# Patient Record
Sex: FEMALE | Race: WHITE | URBAN - METROPOLITAN AREA
[De-identification: names, ages, dates, MRNs, and addresses within clinical notes are randomized per-mention and may not be internally consistent; named-entity substitution may affect disease eponyms.]

---

## 2017-12-13 ENCOUNTER — HOSPITAL ENCOUNTER (OUTPATIENT)
Dept: PHYSICAL THERAPY | Age: 27
Discharge: HOME | End: 2017-12-13
Attending: NURSE PRACTITIONER
Payer: MEDICARE

## 2017-12-13 DIAGNOSIS — R53.1 WEAKNESS: ICD-10-CM

## 2017-12-13 DIAGNOSIS — F50.00 ANOREXIA NERVOSA WITH DANGEROUSLY LOW BODY WEIGHT: ICD-10-CM

## 2017-12-13 DIAGNOSIS — R29.898 DECREASED MUSCLE FUNCTION: ICD-10-CM

## 2017-12-13 PROCEDURE — 97110 THERAPEUTIC EXERCISES: CPT

## 2017-12-13 PROCEDURE — G8978 MOBILITY CURRENT STATUS: HCPCS

## 2017-12-13 PROCEDURE — 97162 PT EVAL MOD COMPLEX 30 MIN: CPT

## 2017-12-13 PROCEDURE — G8979 MOBILITY GOAL STATUS: HCPCS

## 2017-12-13 NOTE — THERAPY EVALUATION
Florence Velazquez  : 1990  [place]  Secondary:  [address]  at [place] [id number] [facility], [address] [place] [address],[address], [facility] [address]  [phone]   [phone]          OUTPATIENT PHYSICAL [place] Assessment 2017    ICD-10: Treatment Diagnosis: weakness M62.81  Precautions/Allergies:   Alprazolam; Amoxicillin-pot clavulanate; Ciprofloxacin; Imitrex [sumatriptan]; and Lorazepam   Fall Risk Score: 0 (? 5 = High Risk)  MD Orders: eval and treat MEDICAL/REFERRING DIAGNOSIS:  Anorexia nervosa with dangerously low body weight [F50.00]  Decreased muscle function [R29.898]  Weakness [R53.1]   DATE OF ONSET: chronic, worsening   REFERRING PHYSICIAN: Russ Simons NP  RETURN PHYSICIAN APPOINTMENT: TBD     INITIAL ASSESSMENT:  Ms. Edgar Randle presents with general LE, UE and core strength deficits, significantly restricted common ankle tendon bilaterally, decreased hip and UE ROM bilaterally, restricted hamstrings and hip flexors bilaterally that limits ability to community ambulate and perform ADLs. Pt would benefit from skilled therapy to address these deficits for return to previous level of function. PROBLEM LIST (Impacting functional limitations):  1. Decreased Strength  2. Decreased ADL/Functional Activities  3. Decreased Transfer Abilities  4. Decreased Ambulation Ability/Technique  5. Decreased Balance  6. Decreased [place] with Home Exercise Program INTERVENTIONS PLANNED:  1. Balance Exercise  2. Gait Training  3. Home Exercise Program (HEP)  4. Manual Therapy  5. Neuromuscular Re-education/Strengthening  6. Therapeutic Exercise/Strengthening   TREATMENT PLAN:  Effective Dates: 17 TO 18. Frequency/Duration: 3 times a week for 6 weeks  GOALS: (Goals have been discussed and agreed upon with patient.)  Short-Term Functional Goals: Time Frame: 4 weeks  1.  Pt will be I with HEP to allow for continued progress with symptom management. 2. Pt will improve sit to stand with no genu valgus. Discharge Goals: Time Frame: 6 weeks  1. Pt will improve LEFS score to >62 to reflect an improvement in function. 2. Pt will improve c/o pain to 2/10 to allow for improved tolerance for ambulation. 3. Pt will improve LE strength globally to 4+/5 for improved ability to community ambulate. 4. Pt will improve UE strength to 5-/5 globally for improved ability to lift and reach. 5. Pt will improve ankle dorsiflexion ROM to 10 degrees bilaterally for improved gait. 6. Pt will improve hip ER ROM to 60 degrees bilaterally for improved ability to sit on floor. Rehabilitation Potential For Stated Goals: Good  Regarding Sandra Parkerer therapy, I certify that the treatment plan above will be carried out by a therapist or under their direction. Thank you for this referral,  Evangelista Navarrete PT     Referring Physician Signature: Avelino Artis NP              Date                    The information in this section was collected on 12-13-17 (except where otherwise noted). HISTORY:   History of Present Injury/Illness (Reason for Referral):  Pt is a 32 y.o. Female presenting to PT with general weakness following hospitalization for 11 months. Was hospitalized for heart failure, liver failure and sepsis. During stay in hospital she states that she atrophied to the point that she could not move her body. Was bed-ridden for 3 months with ventilator and trach. Had difficulty lifting her head during her hospital stay. Went to a nursing home for the muscle wasting and contractures. Had bed sores as well and was limited in ability to participate in PT. Had problems with her feet that resembled drop foot. Went to a eating disorder unit and has been able to return to daily activities. Continues to have trouble with ascending stairs, squatting, sitting on the floor due to hip tightness, laying on her stomach.   C/o numbness and tingling in hips and legs when she tries to sleep on her. Is able to due daily activities but struggles with balance and has to alter the way that she does it. Is easily winded and has some back pain when she prolonged walks. Some c/o tenderness in her feet and ankles. Past Medical History/Comorbidities:   Ms. Wojciech Bedolla  has a past medical history of Heart failure ([facility] [address] (2017) and Liver failure ([facility] [address] (2017). Ms. Wojciech Bedolla  has a past surgical history that includes tracheostomy; appendectomy; and wrist fracture tx. Social History/Living Environment:     lives with dad  Prior Level of Function/Work/Activity:  Prior to most recent hospitalization pt was independent with all ADLs and describes self as an \"active person\"  Personal Factors: Other factors that influence how disability is experienced by the patient:  Previous exercise limitations due to eating disorder    Current Medications:       Current Outpatient Prescriptions:     QUEtiapine (SEROQUEL) 25 mg tablet, Take 1 Tab by mouth nightly., Disp: 30 Tab, Rfl: 1    pregabalin (LYRICA) 50 mg capsule, Take 1 Cap by mouth three (3) times daily. Max Daily Amount: 150 mg., Disp: 21 Cap, Rfl: 0    cyclobenzaprine (FLEXERIL) 5 mg tablet, TAKE 1 TABLET BY MOUTH TWICE A DAY, Disp: 60 Tab, Rfl: 1    clonazePAM (KLONOPIN) 0.5 mg tablet, TAKE 1 TABLET BY MOUTH EVERY DAY AT BEDTIME. Can take 1/2 tablet as needed for anxiety. , Disp: 45 Tab, Rfl: 0    oxyCODONE IR (ROXICODONE) 10 mg tab immediate release tablet, Take 1 Tab by mouth every twelve (12) hours as needed.  Max Daily Amount: 20 mg., Disp: 60 Tab, Rfl: 0    morphine CR (MS CONTIN) 15 mg CR tablet, Take 1 tablet bid x 1 week, then 1 tablet daily x 2 weeks, then stop., Disp: 28 Tab, Rfl: 0   Date Last Reviewed:  12/13/2017    Number of Personal Factors/Comorbidities that affect the Plan of Care: 1-2: MODERATE COMPLEXITY   EXAMINATION:   Observation/Orthostatic Postural Assessment:          Genu valgus with sit-to-stand, general muscle atrophy globally  Strength:            UPPER EXTREMITY      Left  Right    Shoulder Flexion   Shoulder ABD  Shoulder ER  Shoulder IR  Elbow Flexion  Elbow Extension 5-/5  5-/5  5-/5  5-/5  5-/5  5-/5 Shoulder Flexion   Shoulder ABD  Shoulder ER  Shoulder IR  Elbow Flexion  Elbow Extension   5-/5  5-/5  5-/5  5-/5  5-/5  5-/5          LOWER EXTREMITY      Left  Right    Knee flexion  Knee extension  Hip Ext  Hip Flexion  Hip ER  Hip IR   Hip ABD  Dorsiflexion 4-/5  4-/5  3+/5  4+/5  3+/5  3+/5  3+/5  4+/5 Knee flexion   Knee extension  Hip Ext  Hip Flexion  Hip ER  Hip IR  Hip ABD  Dorsiflexion 4-/5  4-/5  3+/5  4/5  3+/5  3+/5  3+/5  4+/5        ROM:    SPINE SPINE    AROM    Flexion   Extension  Right Rotation  Left Rotation  Right Sidebend  Left Sidebend 90 %  90 %  90 %  90 %  90 %  90 %     Hamstrings extensibility- 40 degrees bilaterally  Aden test-  Positive bilaterally  Ankle dorsiflexion- neutral bilaterally    LOWER EXTREMITY      Left  Right    Hip ER  Hip IR 40 Degrees  25 Degrees Hip ER  Hip IR 44 Degrees  22 Degrees         Functional Mobility:         Bed Mobility:  Difficulty with supine to sit due to core strength deficits  Balance:          SLS:  L-  5 seconds, R- 5 seconds       Body Structures Involved:  1. Nerves  2. Joints  3. Muscles Body Functions Affected:  1. Sensory/Pain  2. Neuromusculoskeletal  3. Movement Related Activities and Participation Affected:  1. General Tasks and Demands  2. Mobility  3. Self Care   Number of elements (examined above) that affect the Plan of Care: 4+: HIGH COMPLEXITY   CLINICAL PRESENTATION:   Presentation: Evolving clinical presentation with changing clinical characteristics: MODERATE COMPLEXITY   CLINICAL DECISION MAKING:   Outcome Measure:    Tool Used: Lower Extremity Functional Scale (LEFS)  Score:  Initial: 17/80 Most Recent: X/80 (Date: -- )   Interpretation of Score: 20 questions each scored on a 5 point scale with 0 representing \"extreme difficulty or unable to perform\" and 4 representing \"no difficulty\". The lower the score, the greater the functional disability. 80/80 represents no disability. Minimal detectable change is 9 points. Score 80 79-63 62-48 47-32 31-16 15-1 0   Modifier CH CI CJ CK CL CM CN     ? Mobility - Walking and Moving Around:     - CURRENT STATUS: CL - 60%-79% impaired, limited or restricted    - GOAL STATUS: CI - 1%-19% impaired, limited or restricted    - D/C STATUS:  ---------------To be determined---------------      · Medical Necessity: Patient is expected to demonstrate progress in strength and range of motion to return to previous level of function. Reason for Services/Other Comments:  · Patient continues to require present interventions due to patient's inability to prolonged ambulate. Use of outcome tool(s) and clinical judgement create a POC that gives a: Questionable prediction of patient's progress: MODERATE COMPLEXITY            TREATMENT:   (In addition to Assessment/Re-Assessment sessions the following treatments were rendered)  Pre-treatment Symptoms/Complaints:  General weakness following hospitalization  Pain: Initial:    Post Session:  3/10     THERAPEUTIC EXERCISE: (15 minutes):  Exercises per grid below to improve mobility and strength. Required moderate visual, verbal and manual cues to promote proper body alignment, promote proper body posture and promote proper body mechanics. Progressed resistance, range, repetitions and complexity of movement as indicated.    Date:  12-13-17 Date:   Date:     Activity/Exercise Parameters Parameters Parameters   Hip flexor stretch on table 3 min     Bridge with band 20 reps     Scapular retraction with ER Red x 20 reps     TA isolation 5 min                          MedBridge Portal  Treatment/Session Assessment:    · Response to Treatment:  Pt would benefit from skilled therapy to address the aforementioned deficits that limit functional ability to community ambulate. .  · Compliance with Program/Exercises: Will assess as treatment progresses. · Recommendations/Intent for next treatment session: \"Next visit will focus on advancements to more challenging activities\".   Total Treatment Duration:  PT Patient Time In/Time Out  Time In: 1524  Time Out: [address]

## 2017-12-13 NOTE — PROGRESS NOTES
Ambulatory/Rehab Services H2 Model Falls Risk Assessment    Risk Factor Pts. ·   Confusion/Disorientation/Impulsivity  []    4 ·   Symptomatic Depression  []   2 ·   Altered Elimination  []   1 ·   Dizziness/Vertigo  []   1 ·   Gender (Male)  []   1 ·   Any administered antiepileptics (anticonvulsants):  []   2 ·   Any administered benzodiazepines:  []   1 ·   Visual Impairment (specify):  []   1 ·   Portable Oxygen Use  []   1 ·   Orthostatic ? BP  []   1 ·   History of Recent Falls (within 3 mos.)  []   5     Ability to Rise from Chair (choose one) Pts. ·   Ability to rise in a single movement  [x]   0 ·   Pushes up, successful in one attempt  []   1 ·   Multiple attempts, but successful  []   3 ·   Unable to rise without assistance  []   4   Total: (5 or greater = High Risk) 0     Falls Prevention Plan:   []                Physical Limitations to Exercise (specify):   []                Mobility Assistance Device (type):   []                Exercise/Equipment Adaptation (specify):    ©2010 [facility] [address] Patent #[id number].  Federal Law prohibits the replication, distribution or use without written permission from [facility] [employer]

## 2017-12-19 ENCOUNTER — HOSPITAL ENCOUNTER (OUTPATIENT)
Dept: PHYSICAL THERAPY | Age: 27
Discharge: HOME | End: 2017-12-19
Attending: NURSE PRACTITIONER
Payer: MEDICARE

## 2017-12-19 PROCEDURE — 97110 THERAPEUTIC EXERCISES: CPT

## 2017-12-19 NOTE — PROGRESS NOTES
Tiera Roberson  : 1990  [place]  Secondary:  Therapy Center at [address] [place] [address],[address], [address] [facility]  [phone]   [phone]          OUTPATIENT PHYSICAL THERAPY:Daily Note 2017    ICD-10: Treatment Diagnosis: weakness M62.81  Precautions/Allergies:   Alprazolam; Amoxicillin-pot clavulanate; Ciprofloxacin; Imitrex [sumatriptan]; and Lorazepam   Fall Risk Score: 0 (? 5 = High Risk)  MD Orders: eval and treat MEDICAL/REFERRING DIAGNOSIS:  Anorexia [R63.0]   DATE OF ONSET: chronic, worsening   REFERRING PHYSICIAN: Cardell Spurling, NP  RETURN PHYSICIAN APPOINTMENT: TBELAINE     INITIAL ASSESSMENT:  Ms. Angie Hines presents with general LE, UE and core strength deficits, significantly restricted common ankle tendon bilaterally, decreased hip and UE ROM bilaterally, restricted hamstrings and hip flexors bilaterally that limits ability to community ambulate and perform ADLs. Pt would benefit from skilled therapy to address these deficits for return to previous level of function. PROBLEM LIST (Impacting functional limitations):  1. Decreased Strength  2. Decreased ADL/Functional Activities  3. Decreased Transfer Abilities  4. Decreased Ambulation Ability/Technique  5. Decreased Balance  6. Decreased [place] with Home Exercise Program INTERVENTIONS PLANNED:  1. Balance Exercise  2. Gait Training  3. Home Exercise Program (HEP)  4. Manual Therapy  5. Neuromuscular Re-education/Strengthening  6. Therapeutic Exercise/Strengthening   TREATMENT PLAN:  Effective Dates: 17 TO 18. Frequency/Duration: 3 times a week for 6 weeks  GOALS: (Goals have been discussed and agreed upon with patient.)  Short-Term Functional Goals: Time Frame: 4 weeks  1. Pt will be I with HEP to allow for continued progress with symptom management. 2. Pt will improve sit to stand with no genu valgus.   Discharge Goals: Time Frame: 6 weeks  1. Pt will improve LEFS score to >62 to reflect an improvement in function. 2. Pt will improve c/o pain to 2/10 to allow for improved tolerance for ambulation. 3. Pt will improve LE strength globally to 4+/5 for improved ability to community ambulate. 4. Pt will improve UE strength to 5-/5 globally for improved ability to lift and reach. 5. Pt will improve ankle dorsiflexion ROM to 10 degrees bilaterally for improved gait. 6. Pt will improve hip ER ROM to 60 degrees bilaterally for improved ability to sit on floor. Rehabilitation Potential For Stated Goals: Good  Regarding Roxy Tipton therapy, I certify that the treatment plan above will be carried out by a therapist or under their direction. Thank you for this referral,  Sumi Matthews PTA     Referring Physician Signature: Nata Sifuentes NP              Date                    The information in this section was collected on 12-13-17 (except where otherwise noted). HISTORY:   History of Present Injury/Illness (Reason for Referral):  Pt is a 32 y.o. Female presenting to PT with general weakness following hospitalization for 11 months. Was hospitalized for heart failure, liver failure and sepsis. During stay in hospital she states that she atrophied to the point that she could not move her body. Was bed-ridden for 3 months with ventilator and trach. Had difficulty lifting her head during her hospital stay. Went to a nursing home for the muscle wasting and contractures. Had bed sores as well and was limited in ability to participate in PT. Had problems with her feet that resembled drop foot. Went to a eating disorder unit and has been able to return to daily activities. Continues to have trouble with ascending stairs, squatting, sitting on the floor due to hip tightness, laying on her stomach. C/o numbness and tingling in hips and legs when she tries to sleep on her.   Is able to due daily activities but struggles with balance and has to alter the way that she does it. Is easily winded and has some back pain when she prolonged walks. Some c/o tenderness in her feet and ankles. Past Medical History/Comorbidities:   Ms. Angelo Golden  has a past medical history of Heart failure ([facility] [address] (2017) and Liver failure ([facility] [address] (2017). Ms. Angelo Golden  has a past surgical history that includes tracheostomy; appendectomy; and wrist fracture tx. Social History/Living Environment:     lives with dad  Prior Level of Function/Work/Activity:  Prior to most recent hospitalization pt was independent with all ADLs and describes self as an \"active person\"  Personal Factors: Other factors that influence how disability is experienced by the patient:  Previous exercise limitations due to eating disorder    Current Medications:       Current Outpatient Prescriptions:     azithromycin (ZITHROMAX) 250 mg tablet, Take 1 Tab by mouth See Admin Instructions for 5 days. , Disp: 6 Tab, Rfl: 0    predniSONE (DELTASONE) 10 mg tablet, Take 1 Tab by mouth three (3) times daily. , Disp: 10 Tab, Rfl: 0    [START ON 12/28/2017] oxyCODONE IR (ROXICODONE) 10 mg tab immediate release tablet, Take 1 Tab by mouth every twelve (12) hours as needed. Max Daily Amount: 20 mg., Disp: 60 Tab, Rfl: 0    QUEtiapine (SEROQUEL) 25 mg tablet, Take 1 Tab by mouth nightly., Disp: 30 Tab, Rfl: 1    cyclobenzaprine (FLEXERIL) 5 mg tablet, TAKE 1 TABLET BY MOUTH TWICE A DAY, Disp: 60 Tab, Rfl: 1    clonazePAM (KLONOPIN) 0.5 mg tablet, TAKE 1 TABLET BY MOUTH EVERY DAY AT BEDTIME. Can take 1/2 tablet as needed for anxiety. , Disp: 45 Tab, Rfl: 0   Date Last Reviewed:  12/19/2017    Number of Personal Factors/Comorbidities that affect the Plan of Care: 1-2: MODERATE COMPLEXITY   EXAMINATION:   Observation/Orthostatic Postural Assessment:          Genu valgus with sit-to-stand, general muscle atrophy globally  Strength:            UPPER EXTREMITY      Left  Right    Shoulder Flexion   Shoulder ABD  Shoulder ER  Shoulder IR  Elbow Flexion  Elbow Extension 5-/5  5-/5  5-/5  5-/5  5-/5  5-/5 Shoulder Flexion   Shoulder ABD  Shoulder ER  Shoulder IR  Elbow Flexion  Elbow Extension   5-/5  5-/5  5-/5  5-/5  5-/5  5-/5          LOWER EXTREMITY      Left  Right    Knee flexion  Knee extension  Hip Ext  Hip Flexion  Hip ER  Hip IR   Hip ABD  Dorsiflexion 4-/5  4-/5  3+/5  4+/5  3+/5  3+/5  3+/5  4+/5 Knee flexion   Knee extension  Hip Ext  Hip Flexion  Hip ER  Hip IR  Hip ABD  Dorsiflexion 4-/5  4-/5  3+/5  4/5  3+/5  3+/5  3+/5  4+/5        ROM:    SPINE SPINE    AROM    Flexion   Extension  Right Rotation  Left Rotation  Right Sidebend  Left Sidebend 90 %  90 %  90 %  90 %  90 %  90 %     Hamstrings extensibility- 40 degrees bilaterally  Aden test-  Positive bilaterally  Ankle dorsiflexion- neutral bilaterally    LOWER EXTREMITY      Left  Right    Hip ER  Hip IR 40 Degrees  25 Degrees Hip ER  Hip IR 44 Degrees  22 Degrees         Functional Mobility:         Bed Mobility:  Difficulty with supine to sit due to core strength deficits  Balance:          SLS:  L-  5 seconds, R- 5 seconds       Body Structures Involved:  1. Nerves  2. Joints  3. Muscles Body Functions Affected:  1. Sensory/Pain  2. Neuromusculoskeletal  3. Movement Related Activities and Participation Affected:  1. General Tasks and Demands  2. Mobility  3. Self Care   Number of elements (examined above) that affect the Plan of Care: 4+: HIGH COMPLEXITY   CLINICAL PRESENTATION:   Presentation: Evolving clinical presentation with changing clinical characteristics: MODERATE COMPLEXITY   CLINICAL DECISION MAKING:   Outcome Measure: Tool Used: Lower Extremity Functional Scale (LEFS)  Score:  Initial: 17/80 Most Recent: X/80 (Date: -- )   Interpretation of Score: 20 questions each scored on a 5 point scale with 0 representing \"extreme difficulty or unable to perform\" and 4 representing \"no difficulty\".   The lower the score, the greater the functional disability. 80/80 represents no disability. Minimal detectable change is 9 points. Score 80 79-63 62-48 47-32 31-16 15-1 0   Modifier CH CI CJ CK CL CM CN     ? Mobility - Walking and Moving Around:     - CURRENT STATUS: CL - 60%-79% impaired, limited or restricted    - GOAL STATUS: CI - 1%-19% impaired, limited or restricted    - D/C STATUS:  ---------------To be determined---------------      · Medical Necessity: Patient is expected to demonstrate progress in strength and range of motion to return to previous level of function. Reason for Services/Other Comments:  · Patient continues to require present interventions due to patient's inability to prolonged ambulate. Use of outcome tool(s) and clinical judgement create a POC that gives a: Questionable prediction of patient's progress: MODERATE COMPLEXITY            TREATMENT:   (In addition to Assessment/Re-Assessment sessions the following treatments were rendered)  Pre-treatment Symptoms/Complaints:  \"I was just at [facility], I have sinus infection and brochitis, got an antibiotic, feeling worn down\"  Pain: Initial:    Post Session:  3/10     THERAPEUTIC EXERCISE: (30 minutes):  Exercises per grid below to improve mobility and strength. Required minimal visual, verbal and manual cues to promote proper body alignment, promote proper body posture and promote proper body mechanics. Progressed resistance, range, repetitions and complexity of movement as indicated.    Date:  12-19-17 Date:   Date:     Activity/Exercise Parameters Parameters Parameters   Hip flexor stretch on table 3 min     Bridge with band 20 reps     Scapular retraction with ER Red x 20 reps     UBE X 5 min Level 2.0     Tband Rows-Extension Black x 10 reps     Clam Shell Blue x 15 reps     Standing Hip abduction-knee flexion #2 x 10 reps each                 TA isolation 5 min     Bicep Curl-Tricep Curl #3 x 10 reps     LAQ- seated March #2 x 10 reps              MedBridge Portal  Treatment/Session Assessment:    · Response to Treatment:  Patient fatigued today secondary to having a long weekend of driving to visit boyfriend and not feeling well overall. Did 30 min of therapy today for strengthening and abdominal work. Slowly progress. · Compliance with Program/Exercises: Will assess as treatment progresses. · Recommendations/Intent for next treatment session: \"Next visit will focus on advancements to more challenging activities\".   Total Treatment Duration:  PT Patient Time In/Time Out  Time In: 9026  Time Out: Donna 57, PTA

## 2017-12-21 ENCOUNTER — HOSPITAL ENCOUNTER (OUTPATIENT)
Dept: PHYSICAL THERAPY | Age: 27
Discharge: HOME | End: 2017-12-21
Attending: NURSE PRACTITIONER
Payer: MEDICARE

## 2017-12-21 PROCEDURE — 97140 MANUAL THERAPY 1/> REGIONS: CPT

## 2017-12-21 PROCEDURE — 97110 THERAPEUTIC EXERCISES: CPT

## 2017-12-21 PROCEDURE — 97035 APP MDLTY 1+ULTRASOUND EA 15: CPT

## 2017-12-21 NOTE — PROGRESS NOTES
Carol Ann Rosas  : 1990  [place]  Secondary:  [address] [place]  at [facility]  [address], [address] [facility] [address], [address]  [phone]   [phone]          OUTPATIENT PHYSICAL THERAPY:Daily Note 2017    ICD-10: Treatment Diagnosis: weakness M62.81  Precautions/Allergies:   Alprazolam; Amoxicillin-pot clavulanate; Ciprofloxacin; Imitrex [sumatriptan]; and Lorazepam   Fall Risk Score: 0 (? 5 = High Risk)  MD Orders: eval and treat MEDICAL/REFERRING DIAGNOSIS:  Anorexia [R63.0]   DATE OF ONSET: chronic, worsening   REFERRING PHYSICIAN: Deloris Juan NP  RETURN PHYSICIAN APPOINTMENT: TBD     INITIAL ASSESSMENT:  Ms. Sang Malodnado presents with general LE, UE and core strength deficits, significantly restricted common ankle tendon bilaterally, decreased hip and UE ROM bilaterally, restricted hamstrings and hip flexors bilaterally that limits ability to community ambulate and perform ADLs. Pt would benefit from skilled therapy to address these deficits for return to previous level of function. PROBLEM LIST (Impacting functional limitations):  1. Decreased Strength  2. Decreased ADL/Functional Activities  3. Decreased Transfer Abilities  4. Decreased Ambulation Ability/Technique  5. Decreased Balance  6. Decreased [place] with Home Exercise Program INTERVENTIONS PLANNED:  1. Balance Exercise  2. Gait Training  3. Home Exercise Program (HEP)  4. Manual Therapy  5. Neuromuscular Re-education/Strengthening  6. Therapeutic Exercise/Strengthening   TREATMENT PLAN:  Effective Dates: 17 TO 18. Frequency/Duration: 3 times a week for 6 weeks  GOALS: (Goals have been discussed and agreed upon with patient.)  Short-Term Functional Goals: Time Frame: 4 weeks  1. Pt will be I with HEP to allow for continued progress with symptom management. 2. Pt will improve sit to stand with no genu valgus.   Discharge Goals: Time Frame: 6 weeks  1. Pt will improve LEFS score to >62 to reflect an improvement in function. 2. Pt will improve c/o pain to 2/10 to allow for improved tolerance for ambulation. 3. Pt will improve LE strength globally to 4+/5 for improved ability to community ambulate. 4. Pt will improve UE strength to 5-/5 globally for improved ability to lift and reach. 5. Pt will improve ankle dorsiflexion ROM to 10 degrees bilaterally for improved gait. 6. Pt will improve hip ER ROM to 60 degrees bilaterally for improved ability to sit on floor. Rehabilitation Potential For Stated Goals: Good  Regarding [facility], I certify that the treatment plan above will be carried out by a therapist or under their direction. Thank you for this referral,  Kiarra Varghese PT     Referring Physician Signature: Dana Harrison NP              Date                    The information in this section was collected on 12-13-17 (except where otherwise noted). HISTORY:   History of Present Injury/Illness (Reason for Referral):  Pt is a 32 y.o. Female presenting to PT with general weakness following hospitalization for 11 months. Was hospitalized for heart failure, liver failure and sepsis. During stay in hospital she states that she atrophied to the point that she could not move her body. Was bed-ridden for 3 months with ventilator and trach. Had difficulty lifting her head during her hospital stay. Went to a nursing home for the muscle wasting and contractures. Had bed sores as well and was limited in ability to participate in PT. Had problems with her feet that resembled drop foot. Went to a eating disorder unit and has been able to return to daily activities. Continues to have trouble with ascending stairs, squatting, sitting on the floor due to hip tightness, laying on her stomach. C/o numbness and tingling in hips and legs when she tries to sleep on her.   Is able to due daily activities but struggles with balance and has to alter the way that she does it. Is easily winded and has some back pain when she prolonged walks. Some c/o tenderness in her feet and ankles. Past Medical History/Comorbidities:   Ms. Ese Haddad  has a past medical history of Heart failure ([facility] [address] (2017) and Liver failure ([facility] [address] (2017). Ms. Ese Haddad  has a past surgical history that includes hx tracheostomy; hx appendectomy; and hx wrist fracture tx. Social History/Living Environment:     lives with dad  Prior Level of Function/Work/Activity:  Prior to most recent hospitalization pt was independent with all ADLs and describes self as an \"active person\"  Personal Factors: Other factors that influence how disability is experienced by the patient:  Previous exercise limitations due to eating disorder    Current Medications:       Current Outpatient Prescriptions:     azithromycin (ZITHROMAX) 250 mg tablet, Take 1 Tab by mouth See Admin Instructions for 5 days. , Disp: 6 Tab, Rfl: 0    predniSONE (DELTASONE) 10 mg tablet, Take 1 Tab by mouth three (3) times daily. , Disp: 10 Tab, Rfl: 0    [START ON 12/28/2017] oxyCODONE IR (ROXICODONE) 10 mg tab immediate release tablet, Take 1 Tab by mouth every twelve (12) hours as needed. Max Daily Amount: 20 mg., Disp: 60 Tab, Rfl: 0    QUEtiapine (SEROQUEL) 25 mg tablet, Take 1 Tab by mouth nightly., Disp: 30 Tab, Rfl: 1    cyclobenzaprine (FLEXERIL) 5 mg tablet, TAKE 1 TABLET BY MOUTH TWICE A DAY, Disp: 60 Tab, Rfl: 1    clonazePAM (KLONOPIN) 0.5 mg tablet, TAKE 1 TABLET BY MOUTH EVERY DAY AT BEDTIME. Can take 1/2 tablet as needed for anxiety. , Disp: 45 Tab, Rfl: 0   Date Last Reviewed:  12/21/2017    Number of Personal Factors/Comorbidities that affect the Plan of Care: 1-2: MODERATE COMPLEXITY   EXAMINATION:   Observation/Orthostatic Postural Assessment:          Genu valgus with sit-to-stand, general muscle atrophy globally  Strength:            UPPER EXTREMITY      Left  Right    Shoulder Flexion Shoulder ABD  Shoulder ER  Shoulder IR  Elbow Flexion  Elbow Extension 5-/5  5-/5  5-/5  5-/5  5-/5  5-/5 Shoulder Flexion   Shoulder ABD  Shoulder ER  Shoulder IR  Elbow Flexion  Elbow Extension   5-/5  5-/5  5-/5  5-/5  5-/5  5-/5          LOWER EXTREMITY      Left  Right    Knee flexion  Knee extension  Hip Ext  Hip Flexion  Hip ER  Hip IR   Hip ABD  Dorsiflexion 4-/5  4-/5  3+/5  4+/5  3+/5  3+/5  3+/5  4+/5 Knee flexion   Knee extension  Hip Ext  Hip Flexion  Hip ER  Hip IR  Hip ABD  Dorsiflexion 4-/5  4-/5  3+/5  4/5  3+/5  3+/5  3+/5  4+/5        ROM:    SPINE SPINE    AROM    Flexion   Extension  Right Rotation  Left Rotation  Right Sidebend  Left Sidebend 90 %  90 %  90 %  90 %  90 %  90 %     Hamstrings extensibility- 40 degrees bilaterally  Aden test-  Positive bilaterally  Ankle dorsiflexion- neutral bilaterally    LOWER EXTREMITY      Left  Right    Hip ER  Hip IR 40 Degrees  25 Degrees Hip ER  Hip IR 44 Degrees  22 Degrees         Functional Mobility:         Bed Mobility:  Difficulty with supine to sit due to core strength deficits  Balance:          SLS:  L-  5 seconds, R- 5 seconds       Body Structures Involved:  1. Nerves  2. Joints  3. Muscles Body Functions Affected:  1. Sensory/Pain  2. Neuromusculoskeletal  3. Movement Related Activities and Participation Affected:  1. General Tasks and Demands  2. Mobility  3. Self Care   Number of elements (examined above) that affect the Plan of Care: 4+: HIGH COMPLEXITY   CLINICAL PRESENTATION:   Presentation: Evolving clinical presentation with changing clinical characteristics: MODERATE COMPLEXITY   CLINICAL DECISION MAKING:   Outcome Measure: Tool Used: Lower Extremity Functional Scale (LEFS)  Score:  Initial: 17/80 Most Recent: X/80 (Date: -- )   Interpretation of Score: 20 questions each scored on a 5 point scale with 0 representing \"extreme difficulty or unable to perform\" and 4 representing \"no difficulty\".   The lower the score, the greater the functional disability. 80/80 represents no disability. Minimal detectable change is 9 points. Score 80 79-63 62-48 47-32 31-16 15-1 0   Modifier CH CI CJ CK CL CM CN     ? Mobility - Walking and Moving Around:     - CURRENT STATUS: CL - 60%-79% impaired, limited or restricted    - GOAL STATUS: CI - 1%-19% impaired, limited or restricted    - D/C STATUS:  ---------------To be determined---------------      · Medical Necessity: Patient is expected to demonstrate progress in strength and range of motion to return to previous level of function. Reason for Services/Other Comments:  · Patient continues to require present interventions due to patient's inability to prolonged ambulate. Use of outcome tool(s) and clinical judgement create a POC that gives a: Questionable prediction of patient's progress: MODERATE COMPLEXITY            TREATMENT:   (In addition to Assessment/Re-Assessment sessions the following treatments were rendered)  Pre-treatment Symptoms/Complaints:  Pt reports that she is doing her exercises at home  Pain: Initial: Pain Intensity 1: 0  Post Session:  0/10     THERAPEUTIC EXERCISE: (20 minutes):  Exercises per grid below to improve mobility and strength. Required minimal visual, verbal and manual cues to promote proper body alignment, promote proper body posture and promote proper body mechanics. Progressed resistance, range, repetitions and complexity of movement as indicated. MODALITIES: (10 minutes): *  Ultrasound was used today secondary to the patient having tightened structures limiting joint motion that require an increase in extensibility. Ultrasound was used today to increase muscle flexibility and increase tendon flexibility.     Date:  12-19-17 Date:  12-21-17 Date:     Activity/Exercise Parameters Parameters Parameters   Hip flexor stretch on table 3 min     Bridge with band 20 reps     Scapular retraction with ER Red x 20 reps     UBE X 5 min Level 2.0 6 min fwd/bwd level 3.0    Tband Rows-Extension Black x 10 reps     Clam Shell Blue x 15 reps     Standing Hip abduction-knee flexion #2 x 10 reps each                 TA isolation 5 min     Bicep Curl-Tricep Curl #3 x 10 reps     LAQ- seated March #2 x 10 reps     Figure 4 bridge  20 reps each    SLR  20 reps each    March with pause  4x30 ft    Side stepping  4x30 ft red band    Leg press  35 pounds B LE x 20 reps           MANUAL THERAPY (10 minutes)  Dorsiflexion ROM, hip ROM bilaterally To promote tissue length and joint mobility for return to previous level of function. MedBridge Portal  Treatment/Session Assessment:    · Response to Treatment:  Patient tolerating strengthening and manual stretching today. Continues to lack functional dorsiflexion. · Compliance with Program/Exercises: Will assess as treatment progresses. · Recommendations/Intent for next treatment session: \"Next visit will focus on advancements to more challenging activities\".   Total Treatment Duration:  PT Patient Time In/Time Out  Time In: 8714  Time Out: 102 [facility], PT

## 2017-12-28 ENCOUNTER — HOSPITAL ENCOUNTER (OUTPATIENT)
Dept: PHYSICAL THERAPY | Age: 27
Discharge: HOME | End: 2017-12-28
Attending: NURSE PRACTITIONER
Payer: MEDICARE

## 2017-12-28 NOTE — PROGRESS NOTES
Chad Lam  : 1990  [place]  Secondary:  [address] [place]  at [place] [id number] [place] [address], [address] [facility]  [phone]   [phone]      Pt unable to attend scheduled appointment due to being sick.     Chad Lam, PT, DPT

## 2018-01-04 ENCOUNTER — APPOINTMENT (OUTPATIENT)
Dept: PHYSICAL THERAPY | Age: 28
End: 2018-01-04
Attending: NURSE PRACTITIONER

## 2018-01-05 ENCOUNTER — APPOINTMENT (OUTPATIENT)
Dept: PHYSICAL THERAPY | Age: 28
End: 2018-01-05
Attending: NURSE PRACTITIONER

## 2018-01-09 ENCOUNTER — APPOINTMENT (OUTPATIENT)
Dept: PHYSICAL THERAPY | Age: 28
End: 2018-01-09
Attending: NURSE PRACTITIONER

## 2018-01-11 ENCOUNTER — APPOINTMENT (OUTPATIENT)
Dept: PHYSICAL THERAPY | Age: 28
End: 2018-01-11
Attending: NURSE PRACTITIONER

## 2018-01-12 ENCOUNTER — APPOINTMENT (OUTPATIENT)
Dept: PHYSICAL THERAPY | Age: 28
End: 2018-01-12
Attending: NURSE PRACTITIONER

## 2018-05-21 ENCOUNTER — PATIENT OUTREACH (OUTPATIENT)
Dept: CASE MANAGEMENT | Age: 28
End: 2018-05-21

## 2018-05-21 NOTE — PROGRESS NOTES
· Referral received on patient for recommendations on placement in a facility for eating disorder  · Patient has anorexia with a BMI of 12.75  · Patient is scheduled for labs today at PCP office  · I sent [employer] message to Reese Pinto NP about what social workers and myself have found out about resources for patient:  · \"We received the referral on Ms. Amber Ho and the social workers and myself have reviewed the chart. 1.  [facility] will not re-admit due to patient is non-compliant with their program. Patient also stated that she didn't agree with their program.  Patient works with Terrea Hatchet MSW at [facility]. 2. At [facility] patient spoke with Caden Frey and he offered the following list of institutions which all have declined to accept patient due to a BMI of less that 14 and unsafe to travel. [place] institutions are as follows: [address] [place] [address], [address] [facility], [address] for Overcoming Eating Disorders in [place] has a therapist Koki Rubio at [facility] in [address]     I have asked my team members to help me brainstorm and come up with any alternatives for this patient.     Would Nati Flores be willing to have a PEG tube placed by a GI doctor?   She may not be willing to do that but just a thought. .. Valerie Mccallum \"     · Will also ask patient if she has contacted [facility] in [facility] They have inpatient eating disorder treatment  would just need to see if they take Medicare? To reach the Partial Hospitalization/ Intensive Outpatient Program directly, call [phone]  · Will outreach to patient and follow up about these resources and possibility of a GI referral.  · Patient has f/u apt at PCP office on 5/24/18    This note will not be viewable in [address]

## 2018-05-22 ENCOUNTER — PATIENT OUTREACH (OUTPATIENT)
Dept: CASE MANAGEMENT | Age: 28
End: 2018-05-22

## 2018-05-22 NOTE — PROGRESS NOTES
· Patient had labs drawn on 5/21/18  · Patient has f/u apt with PCP on 5/24/18  · NP will ask patient about a possible referral to GI for PEG tube but NP doesn't think patient will agree to this  · Will follow up on 5/25/18 after apt with PCP office  This note will not be viewable in [address]

## 2018-05-25 ENCOUNTER — PATIENT OUTREACH (OUTPATIENT)
Dept: CASE MANAGEMENT | Age: 28
End: 2018-05-25

## 2018-05-25 NOTE — PROGRESS NOTES
· Outreached to patient to follow up and give additional phone numbers for possible eating disorder resources   · [place] eating disorders [phone]  · [address] eating disorders/behavioral health [phone]  · Patient stated that Dione Lomeli is reviewing her information again and will be calling her back  · Patient stated that she had left a message at [address] and is awaiting a return phone call  · Patient is adamantly against having a PEG tube or feeding tube placed  · Patient scheduled to f/u with Chivo Felix NP on 5/29/18  · Will follow up with patient next week about whether or not facilities will accept patient for treatment   This note will not be viewable in [address]

## 2018-06-01 ENCOUNTER — PATIENT OUTREACH (OUTPATIENT)
Dept: CASE MANAGEMENT | Age: 28
End: 2018-06-01

## 2018-06-01 NOTE — PROGRESS NOTES
· Patient's labs redrawn on 5/29/18 and labs are improving  · Weight was 65.8  · Patient still attending counseling   · Labs to be redrawn next week  · Patient has my contact information  · Will f/u with patient in 2 weeks  This note will not be viewable in [address]

## 2018-06-14 NOTE — PROGRESS NOTES
Morales Orellana  : 1990  [place]  Secondary:  [address] [place]  at [place] [address] [facility], [address] [place] [address],[address], [facility] [address]  [phone]   [phone]          OUTPATIENT PHYSICAL THERAPY:Discontinuation Summary 2017    ICD-10: Treatment Diagnosis: weakness M62.81  Precautions/Allergies:   Alprazolam; Amoxicillin-pot clavulanate; Ciprofloxacin; Imitrex [sumatriptan]; Lorazepam; and Sulfa (sulfonamide antibiotics)   Fall Risk Score: 0 (? 5 = High Risk)  MD Orders: eval and treat MEDICAL/REFERRING DIAGNOSIS:  Anorexia nervosa with dangerously low body weight [F50.00]  Decreased muscle function [R29.898]  Weakness [R53.1]   DATE OF ONSET: chronic, worsening   REFERRING PHYSICIAN: Sergey Wyatt NP  RETURN PHYSICIAN APPOINTMENT: TBD     INITIAL ASSESSMENT:  Ms. Angelina Logan attended 3 visits from 17 to 17 with fair improvement but self-D/C due to re-admittance into hospital for co-morbidities. PROBLEM LIST (Impacting functional limitations):  1. Decreased Strength  2. Decreased ADL/Functional Activities  3. Decreased Transfer Abilities  4. Decreased Ambulation Ability/Technique  5. Decreased Balance  6. Decreased [place] with Home Exercise Program INTERVENTIONS PLANNED:  1. Balance Exercise  2. Gait Training  3. Home Exercise Program (HEP)  4. Manual Therapy  5. Neuromuscular Re-education/Strengthening  6. Therapeutic Exercise/Strengthening   Unable to assess secondary to pt self-DC. TREATMENT PLAN:  Effective Dates: 17 TO 18. Frequency/Duration: 3 times a week for 6 weeks  GOALS: (Goals have been discussed and agreed upon with patient.)  Short-Term Functional Goals: Time Frame: 4 weeks  1. Pt will be I with HEP to allow for continued progress with symptom management. 2. Pt will improve sit to stand with no genu valgus. Discharge Goals: Time Frame: 6 weeks  1.  Pt will improve LEFS score to >62 to reflect an improvement in function. 2. Pt will improve c/o pain to 2/10 to allow for improved tolerance for ambulation. 3. Pt will improve LE strength globally to 4+/5 for improved ability to community ambulate. 4. Pt will improve UE strength to 5-/5 globally for improved ability to lift and reach. 5. Pt will improve ankle dorsiflexion ROM to 10 degrees bilaterally for improved gait. 6. Pt will improve hip ER ROM to 60 degrees bilaterally for improved ability to sit on floor. Rehabilitation Potential For Stated Goals: Good  Regarding [facility] therapy, I certify that the treatment plan above will be carried out by a therapist or under their direction. Thank you for this referral,  Sagar Pacheco PT     Referring Physician Signature: Melvin Palacios NP              Date                    The information in this section was collected on 12-13-17 (except where otherwise noted). HISTORY:   History of Present Injury/Illness (Reason for Referral):  Pt is a 32 y.o. Female presenting to PT with general weakness following hospitalization for 11 months. Was hospitalized for heart failure, liver failure and sepsis. During stay in hospital she states that she atrophied to the point that she could not move her body. Was bed-ridden for 3 months with ventilator and trach. Had difficulty lifting her head during her hospital stay. Went to a nursing home for the muscle wasting and contractures. Had bed sores as well and was limited in ability to participate in PT. Had problems with her feet that resembled drop foot. Went to a eating disorder unit and has been able to return to daily activities. Continues to have trouble with ascending stairs, squatting, sitting on the floor due to hip tightness, laying on her stomach. C/o numbness and tingling in hips and legs when she tries to sleep on her. Is able to due daily activities but struggles with balance and has to alter the way that she does it.   Is easily winded and has some back pain when she prolonged walks. Some c/o tenderness in her feet and ankles. Past Medical History/Comorbidities:   Ms. Lay Ortega  has a past medical history of Heart failure ([facility] [address] (2017) and Liver failure ([facility] [address] (2017). Ms. Lay Ortega  has a past surgical history that includes hx tracheostomy; hx appendectomy; and hx wrist fracture tx. Social History/Living Environment:     lives with dad  Prior Level of Function/Work/Activity:  Prior to most recent hospitalization pt was independent with all ADLs and describes self as an \"active person\"  Personal Factors: Other factors that influence how disability is experienced by the patient:  Previous exercise limitations due to eating disorder    Current Medications:       Current Outpatient Prescriptions:     QUEtiapine (SEROQUEL) 25 mg tablet, Take 1 Tab by mouth nightly. Take 1-2 tablet by mouth., Disp: 60 Tab, Rfl: 3    FLUoxetine (PROZAC) 20 mg tablet, Take 1 Tab by mouth daily. , Disp: 90 Tab, Rfl: 1    Foam Bandage (MEPILEX) 4 X 4 \" bndg, Apply to pressure ulcer daily, Disp: 30 Each, Rfl: 11    ibuprofen (MOTRIN) 800 mg tablet, Take 1 Tab by mouth two (2) times daily as needed for Pain., Disp: 60 Tab, Rfl: 2    cyclobenzaprine (FLEXERIL) 10 mg tablet, Take 1 Tab by mouth two (2) times a day. TAKE 1 TABLET BY MOUTH TWICE A DAY, Disp: 60 Tab, Rfl: 2    clonazePAM (KLONOPIN) 0.5 mg tablet, Take 1 Tab by mouth two (2) times a day. Max Daily Amount: 1 mg. TAKE 1 TABLET BY MOUTH EVERY DAY AT BEDTIME. Can take 1/2 tablet as needed for anxiety. , Disp: 60 Tab, Rfl: 2    magnesium hydroxide (PERAZA MILK OF MAGNESIA) 400 mg/5 mL suspension, Take 30 mL by mouth daily as needed for Constipation. , Disp: , Rfl:     docusate sodium (COLACE) 100 mg capsule, Take 100 mg by mouth two (2) times a day., Disp: , Rfl:    Date Last Reviewed:  6/14/2018    Number of Personal Factors/Comorbidities that affect the Plan of Care: 1-2: MODERATE COMPLEXITY EXAMINATION:   Unable to assess secondary to pt self-DC. Observation/Orthostatic Postural Assessment:          Genu valgus with sit-to-stand, general muscle atrophy globally  Strength:            UPPER EXTREMITY      Left  Right    Shoulder Flexion   Shoulder ABD  Shoulder ER  Shoulder IR  Elbow Flexion  Elbow Extension 5-/5  5-/5  5-/5  5-/5  5-/5  5-/5 Shoulder Flexion   Shoulder ABD  Shoulder ER  Shoulder IR  Elbow Flexion  Elbow Extension   5-/5  5-/5  5-/5  5-/5  5-/5  5-/5          LOWER EXTREMITY      Left  Right    Knee flexion  Knee extension  Hip Ext  Hip Flexion  Hip ER  Hip IR   Hip ABD  Dorsiflexion 4-/5  4-/5  3+/5  4+/5  3+/5  3+/5  3+/5  4+/5 Knee flexion   Knee extension  Hip Ext  Hip Flexion  Hip ER  Hip IR  Hip ABD  Dorsiflexion 4-/5  4-/5  3+/5  4/5  3+/5  3+/5  3+/5  4+/5        ROM:    SPINE SPINE    AROM    Flexion   Extension  Right Rotation  Left Rotation  Right Sidebend  Left Sidebend 90 %  90 %  90 %  90 %  90 %  90 %     Hamstrings extensibility- 40 degrees bilaterally  Aden test-  Positive bilaterally  Ankle dorsiflexion- neutral bilaterally    LOWER EXTREMITY      Left  Right    Hip ER  Hip IR 40 Degrees  25 Degrees Hip ER  Hip IR 44 Degrees  22 Degrees         Functional Mobility:         Bed Mobility:  Difficulty with supine to sit due to core strength deficits  Balance:          SLS:  L-  5 seconds, R- 5 seconds       Body Structures Involved:  1. Nerves  2. Joints  3. Muscles Body Functions Affected:  1. Sensory/Pain  2. Neuromusculoskeletal  3. Movement Related Activities and Participation Affected:  1. General Tasks and Demands  2. Mobility  3. Self Care   Number of elements (examined above) that affect the Plan of Care: 4+: HIGH COMPLEXITY   CLINICAL PRESENTATION:   Presentation: Evolving clinical presentation with changing clinical characteristics: MODERATE COMPLEXITY   CLINICAL DECISION MAKING:   Unable to assess secondary to pt self-DC. Outcome Measure:    Tool Used: Lower Extremity Functional Scale (LEFS)  Score:  Initial: 17/80 Most Recent: X/80 (Date: -- )   Interpretation of Score: 20 questions each scored on a 5 point scale with 0 representing \"extreme difficulty or unable to perform\" and 4 representing \"no difficulty\". The lower the score, the greater the functional disability. 80/80 represents no disability. Minimal detectable change is 9 points. Score 80 79-63 62-48 47-32 31-16 15-1 0   Modifier CH CI CJ CK CL CM CN     ? Mobility - Walking and Moving Around:     - CURRENT STATUS: CL - 60%-79% impaired, limited or restricted    - GOAL STATUS: CI - 1%-19% impaired, limited or restricted    - D/C STATUS:  ---------------To be determined---------------      · Medical Necessity: Patient is expected to demonstrate progress in strength and range of motion to return to previous level of function. Reason for Services/Other Comments:  · Patient continues to require present interventions due to patient's inability to prolonged ambulate.    Use of outcome tool(s) and clinical judgement create a POC that gives a: Questionable prediction of patient's progress: MODERATE COMPLEXITY            TREATMENT:   (In addition to Assessment/Re-Assessment sessions the following treatments were rendered)    Jose J Ramos PT

## 2018-06-15 ENCOUNTER — PATIENT OUTREACH (OUTPATIENT)
Dept: CASE MANAGEMENT | Age: 28
End: 2018-06-15

## 2018-06-15 NOTE — PROGRESS NOTES
· Patient's labs redrawn on 6/12/18 and labs are improving  · Patient in PCP office on 6/14/18 and Wt 63.2B/Pt 80/60 repeat was 82/54   · Patient was in the [address] from 5/30/18 through 6/4/18 for parasthesia of right arm, hypotension, dehydration  · PCP office still faxing information trying to get patient admitted to eating disorder clinic  · Patient still attending counseling   · Patient has f/u appointment with PCP on 6/20/18  · Patient has my contact information  · Will f/u with patient in 2 weeks  This note will not be viewable in [address]

## 2018-06-29 ENCOUNTER — PATIENT OUTREACH (OUTPATIENT)
Dept: CASE MANAGEMENT | Age: 28
End: 2018-06-29

## 2018-07-13 ENCOUNTER — PATIENT OUTREACH (OUTPATIENT)
Dept: CASE MANAGEMENT | Age: 28
End: 2018-07-13

## 2018-07-13 NOTE — PROGRESS NOTES
· Followed up with patient   · Patient went to [facility] ER for chest pain/anorexia  · Patient was admitted to [facility] under psychiatric services from 6/29/18 - 7/12/18 for anorexia nervosa/chest pain  · She was evaluated by psychiatry, inpatient nutrition, and hospitalist service (medical mgmt of potential refeeding syndrome) as well as  assistance for inpatient eating disorder placement. Unfortunately, options are limited with her previous failed therapies and continued failure of inpatient management of anorexia. \"Anorexia: gradual reinstitution of PO intake. She is at low risk of refeeding syndrome since PO intake has been poor. \"    PLAN: Patient has been accepted to [address] [place] [address] [facility] in [place] and patient is planning to fly out for admission on Tuesday, 7/17/18. Will follow up with patient in 21 days. This note will not be viewable in [address]

## 2018-08-03 ENCOUNTER — PATIENT OUTREACH (OUTPATIENT)
Dept: CASE MANAGEMENT | Age: 28
End: 2018-08-03

## 2018-08-03 NOTE — PROGRESS NOTES
Follow up with patient - patient still admitted to [address] [facility] in [place] Will follow up again in 21 days This note will not be viewable in [address]

## 2018-08-24 ENCOUNTER — PATIENT OUTREACH (OUTPATIENT)
Dept: CASE MANAGEMENT | Age: 28
End: 2018-08-24

## 2018-08-24 NOTE — PROGRESS NOTES
Patient discharged to home on 8/21/18 from facility in [place]  Patient did gain 6 pounds  Patient has f/u apt with PCP on 8/30/18  Patient's father wants patient to be more aggressive in health plan but patient's psychiatrist thinks smaller steps will lead patient to a more successful plan of care     Psychiatry discharge summary:  Axis I:   Anorexia nervosa, restricting type, extreme, severe and enduring  Generalized anxiety disorder  PTSD  MDD   [place] II:   No diagnosis   Axis III:   Severe malnutrition, bradycardia, hyponatremia, hypokalemia, hx of extensive medical complications secondary to ED   Axis IV:  Severe   Axis V:   GAF 25   Did discuss potential to set up palliative services at home as well. She is considering this as an option given the difficulties in receiving care at such low BMI  WEIGHT CHANGE  Admission Weight and BMI: 26 kg (57 lb 4.8 oz), Body mass index is 11 kg/(m^2). Discharge Weight and BMI: 28.3 kg (62 lb 4.8 oz), Body mass index is 11.96 kg/(m^2). Plan:  Patient instructed patient to take small steps to improving health  Weight restoration to a healthier BMI, normalization of eating, learn/practice coping skills  Will follow up with patient again in one week   This note will not be viewable in [address]

## 2018-08-31 ENCOUNTER — PATIENT OUTREACH (OUTPATIENT)
Dept: CASE MANAGEMENT | Age: 28
End: 2018-08-31

## 2018-08-31 NOTE — PROGRESS NOTES
Patient had hospital follow up appt with PCP on 8/30/18 Anastacio Mantilla at [place]) Patient labs have improved Patient wants to return to [facility] in [place] in the next 2 weeks when she has new insurance policy Patient improving at this time PLAN: 
Case closed and patient graduated at this time Will be available and happy to re-open case if warranted This note will not be viewable in [address]

## 2020-01-08 NOTE — PROGRESS NOTES
Patient's PCP working on getting patient admitted to [address] has discharged patient for non-compliance  Labs WNL at last office visit on 6/26/18  PLAN:   Will follow up with patient and PCP office in 2 weeks about progress on patient being admitted to inpatient treatment for anorexia  This note will not be viewable in [facility]. Discarded in the usual manner